# Patient Record
Sex: MALE | Race: WHITE | NOT HISPANIC OR LATINO | Employment: FULL TIME | URBAN - METROPOLITAN AREA
[De-identification: names, ages, dates, MRNs, and addresses within clinical notes are randomized per-mention and may not be internally consistent; named-entity substitution may affect disease eponyms.]

---

## 2018-07-09 ENCOUNTER — OFFICE VISIT (OUTPATIENT)
Dept: INTERNAL MEDICINE | Facility: CLINIC | Age: 43
End: 2018-07-09

## 2018-07-09 VITALS
TEMPERATURE: 98.3 F | HEIGHT: 76 IN | DIASTOLIC BLOOD PRESSURE: 76 MMHG | HEART RATE: 72 BPM | OXYGEN SATURATION: 96 % | WEIGHT: 296.9 LBS | BODY MASS INDEX: 36.15 KG/M2 | SYSTOLIC BLOOD PRESSURE: 110 MMHG

## 2018-07-09 DIAGNOSIS — E66.09 CLASS 2 OBESITY DUE TO EXCESS CALORIES WITHOUT SERIOUS COMORBIDITY WITH BODY MASS INDEX (BMI) OF 36.0 TO 36.9 IN ADULT: ICD-10-CM

## 2018-07-09 DIAGNOSIS — Z00.00 HEALTHCARE MAINTENANCE: Primary | ICD-10-CM

## 2018-07-09 PROBLEM — E66.9 CLASS 2 OBESITY WITHOUT SERIOUS COMORBIDITY WITH BODY MASS INDEX (BMI) OF 36.0 TO 36.9 IN ADULT: Status: ACTIVE | Noted: 2018-07-09

## 2018-07-09 PROBLEM — E66.812 CLASS 2 OBESITY WITHOUT SERIOUS COMORBIDITY WITH BODY MASS INDEX (BMI) OF 36.0 TO 36.9 IN ADULT: Status: ACTIVE | Noted: 2018-07-09

## 2018-07-09 PROCEDURE — 99386 PREV VISIT NEW AGE 40-64: CPT | Performed by: FAMILY MEDICINE

## 2018-07-09 NOTE — PROGRESS NOTES
Subjective   Robin Arora is a 42 y.o. male.     Chief Complaint   Patient presents with   • new patient visit   • enlarged liver     [facility]   • mild discomfort in right ear         History of Present Illness   Robin Arora 42 y.o. male who presents for an Annual Wellness Visit.  he has a history of   Patient Active Problem List   Diagnosis   • Healthcare maintenance   • Class 2 obesity without serious comorbidity with body mass index (BMI) of 36.0 to 36.9 in adult   .  he has been feeling well.  Labs results discussed in detail with the patient.  Plan to update vaccines if needed today.  I  reviewed health maintenance with him as part of my preventative care plan.    Health Habits:  Dental Exam. unknown  Eye Exam. up to date  Exercise: 0 times/week.  Current exercise activities include: none        The following portions of the patient's history were reviewed and updated as appropriate: allergies, current medications, past family history, past medical history, past social history, past surgical history and problem list.    Review of Systems   Constitutional: Negative for appetite change, fever and unexpected weight change.   HENT: Negative for ear pain, facial swelling and sore throat.    Eyes: Negative for pain and visual disturbance.   Respiratory: Negative for chest tightness, shortness of breath and wheezing.    Cardiovascular: Negative for chest pain and palpitations.   Gastrointestinal: Negative for abdominal pain and blood in stool.   Endocrine: Negative.    Genitourinary: Negative for difficulty urinating and hematuria.   Musculoskeletal: Negative for joint swelling.   Neurological: Negative for tremors, seizures and syncope.   Hematological: Negative for adenopathy.   Psychiatric/Behavioral: Negative.        Objective   Physical Exam   Constitutional: He is oriented to person, place, and time. He appears well-developed and well-nourished. No distress.   HENT:   Head: Normocephalic and atraumatic.    Mouth/Throat: Oropharynx is clear and moist.   Debris bilaterally ears   Eyes: Conjunctivae and EOM are normal. Pupils are equal, round, and reactive to light. Right eye exhibits no discharge. Left eye exhibits no discharge. No scleral icterus.   Neck: Normal range of motion. Neck supple. No tracheal deviation present. No thyromegaly present.   Cardiovascular: Normal rate, regular rhythm, normal heart sounds, intact distal pulses and normal pulses.  Exam reveals no gallop.    No murmur heard.  Pulmonary/Chest: Effort normal and breath sounds normal. No respiratory distress. He has no wheezes. He has no rales.   Musculoskeletal: Normal range of motion.   Neurological: He is alert and oriented to person, place, and time. He exhibits normal muscle tone. Coordination normal.   Skin: Skin is warm. No rash noted. No erythema. No pallor.   Psychiatric: He has a normal mood and affect. His behavior is normal. Judgment and thought content normal.   Nursing note and vitals reviewed.      Assessment/Plan   Robin was seen today for new patient visit, enlarged liver and mild discomfort in right ear.    Diagnoses and all orders for this visit:    Healthcare maintenance    Class 2 obesity due to excess calories without serious comorbidity with body mass index (BMI) of 36.0 to 36.9 in adult    [facility] UA A1c and lipid panel TSH  Recommend summers here prevention after swimming if symptoms persist follow-up appointment or had topical antibiotic recommend weight loss with diet change decrease calories decreased soft drinks increase physical activity for a target heart rate of 135 bpm for 30 minutes 3 times a week follow-up otherwise as needed or in one year

## 2018-07-11 ENCOUNTER — TELEPHONE (OUTPATIENT)
Dept: INTERNAL MEDICINE | Facility: CLINIC | Age: 43
End: 2018-07-11

## 2018-07-11 NOTE — TELEPHONE ENCOUNTER
"Patient called stating that after speaking with his insurance company he would like for his visit on Monday to be coded as a \"wellness\" since it would be covered.  Please advise.  "

## 2018-12-17 ENCOUNTER — OFFICE VISIT (OUTPATIENT)
Dept: INTERNAL MEDICINE | Facility: CLINIC | Age: 43
End: 2018-12-17

## 2018-12-17 VITALS
WEIGHT: 291.6 LBS | DIASTOLIC BLOOD PRESSURE: 70 MMHG | OXYGEN SATURATION: 99 % | BODY MASS INDEX: 35.51 KG/M2 | HEIGHT: 76 IN | SYSTOLIC BLOOD PRESSURE: 110 MMHG | HEART RATE: 91 BPM | TEMPERATURE: 98.5 F

## 2018-12-17 DIAGNOSIS — E66.09 CLASS 2 OBESITY DUE TO EXCESS CALORIES WITHOUT SERIOUS COMORBIDITY WITH BODY MASS INDEX (BMI) OF 36.0 TO 36.9 IN ADULT: Primary | ICD-10-CM

## 2018-12-17 DIAGNOSIS — K21.9 GASTROESOPHAGEAL REFLUX DISEASE WITHOUT ESOPHAGITIS: ICD-10-CM

## 2018-12-17 DIAGNOSIS — G47.33 OBSTRUCTIVE SLEEP APNEA SYNDROME: ICD-10-CM

## 2018-12-17 DIAGNOSIS — F51.01 PRIMARY INSOMNIA: ICD-10-CM

## 2018-12-17 PROCEDURE — 99214 OFFICE O/P EST MOD 30 MIN: CPT | Performed by: FAMILY MEDICINE

## 2018-12-17 RX ORDER — ZOLPIDEM TARTRATE 5 MG/1
5 TABLET ORAL NIGHTLY PRN
Qty: 30 TABLET | Refills: 1 | Status: SHIPPED | OUTPATIENT
Start: 2018-12-17 | End: 2019-01-07

## 2018-12-17 RX ORDER — DEXTROAMPHETAMINE SULFATE 5 MG/1
1 TABLET ORAL 2 TIMES DAILY
COMMUNITY
Start: 2018-11-15

## 2018-12-17 NOTE — PROGRESS NOTES
Robin Arora is a 43 y.o. male.      Assessment/Plan   Problem List Items Addressed This Visit        Respiratory    Obstructive sleep apnea syndrome       Digestive    Class 2 obesity without serious comorbidity with body mass index (BMI) of 36.0 to 36.9 in adult - Primary    Gastroesophageal reflux disease without esophagitis       Other    Primary insomnia         trial Ambien  Patient will follow-up in 3 weeks patient will call in success of using the Ambien  [facility]mission [facility] were reviewed and suppressed HDL otherwise normal lipid with low LDL and total cholesterol has been included medius section the chart    Chief Complaint   Patient presents with   • Insomnia     patient has a sleep apnea [facility]   • would like to discuss lab results     [facility]   • patient taking medication for acid  reflux but does not know     Social History     Tobacco Use   • Smoking status: Never Smoker   • Smokeless tobacco: Never Used   Substance Use Topics   • Alcohol use: Yes     Comment: 1-5 per month   • Drug use: Not on file       History of Present Illness   Subjective follow-up to discuss chronic conditions of obstructive sleep apnea obesity GERD insomnia he's been diagnosed with [facility] and cannot tolerate CPAP machine because he has trouble falling asleep he finally given some time but it's been many months he still can't tolerate apparatus as well as his unfortunate fluctuating sleep schedule due to shift work changes his usual sleep is from 6 in the morning or 7 in the morning till noon or 1 in the afternoon and goes to work at 6:00 at night not have much benefit with over-the-counter remedies.  Is trying to lose weight with increased physical activity with minimal success his reflux is well controlled with pantoprazole  The following portions of the patient's history were reviewed and updated as appropriate:PMHroutine: Social history , Past Medical History, Surgical history ,  "Allergies, Current Medications, Active Problem List and Health Maintenance    Review of Systems   Constitutional: Negative for activity change, appetite change and unexpected weight change.   HENT: Negative for nosebleeds and trouble swallowing.    Eyes: Negative for pain and visual disturbance.   Respiratory: Negative for chest tightness, shortness of breath and wheezing.    Cardiovascular: Negative for chest pain and palpitations.   Gastrointestinal: Negative for abdominal pain and blood in stool.   Endocrine: Negative.    Genitourinary: Negative for difficulty urinating and hematuria.   Musculoskeletal: Negative for joint swelling.   Skin: Negative for color change and rash.   Allergic/Immunologic: Negative.    Neurological: Negative for syncope and speech difficulty.   Hematological: Negative for adenopathy.   Psychiatric/Behavioral: Negative for agitation and confusion.   All other systems reviewed and are negative.      Objective   Vitals:    12/17/18 1549   BP: 110/70   BP Location: Right arm   Patient Position: Sitting   Cuff Size: Large Adult   Pulse: 91   Temp: 98.5 °F (36.9 °C)   TempSrc: Oral   SpO2: 99%   Weight: 132 kg (291 lb 9.6 oz)   Height: 191.8 cm (75.5\")     Body mass index is 35.97 kg/m².  Physical Exam   Constitutional: He is oriented to person, place, and time. He appears well-developed and well-nourished.   HENT:   Head: Normocephalic and atraumatic.   Eyes: Conjunctivae are normal. Pupils are equal, round, and reactive to light.   Neck: Normal range of motion.   Cardiovascular: Normal rate.   Pulmonary/Chest: Effort normal.   Musculoskeletal: Normal range of motion. He exhibits no edema.   Neurological: He is alert and oriented to person, place, and time.   Psychiatric: He has a normal mood and affect. His behavior is normal. Judgment and thought content normal.   Nursing note and vitals reviewed.    Reviewed Data:  No visits with results within 1 Month(s) from this visit.   Latest known " visit with results is:   No results found for any previous visit.

## 2019-01-07 ENCOUNTER — TELEPHONE (OUTPATIENT)
Dept: INTERNAL MEDICINE | Facility: CLINIC | Age: 44
End: 2019-01-07

## 2019-01-07 RX ORDER — TRAZODONE HYDROCHLORIDE 50 MG/1
50 TABLET ORAL NIGHTLY
Qty: 30 TABLET | Refills: 0 | Status: SHIPPED | OUTPATIENT
Start: 2019-01-07 | End: 2019-10-20 | Stop reason: SDUPTHER

## 2019-01-07 NOTE — TELEPHONE ENCOUNTER
"Patient called stating that the Ambien is not working for him.  He is waking up every 4 hours - he has taken 3-4 at night and has had no relief.  Patient stated that his \"nurse friends\" have told him that he needs Trazodone.  Please advise.  "

## 2019-06-17 ENCOUNTER — OFFICE VISIT (OUTPATIENT)
Dept: INTERNAL MEDICINE | Facility: CLINIC | Age: 44
End: 2019-06-17

## 2019-06-17 VITALS
OXYGEN SATURATION: 97 % | SYSTOLIC BLOOD PRESSURE: 140 MMHG | TEMPERATURE: 98.6 F | WEIGHT: 281.5 LBS | BODY MASS INDEX: 34.72 KG/M2 | DIASTOLIC BLOOD PRESSURE: 76 MMHG | HEART RATE: 74 BPM

## 2019-06-17 DIAGNOSIS — F51.01 PRIMARY INSOMNIA: ICD-10-CM

## 2019-06-17 DIAGNOSIS — F41.9 ANXIETY: ICD-10-CM

## 2019-06-17 DIAGNOSIS — G47.33 OBSTRUCTIVE SLEEP APNEA SYNDROME: ICD-10-CM

## 2019-06-17 DIAGNOSIS — N52.9 ERECTILE DYSFUNCTION, UNSPECIFIED ERECTILE DYSFUNCTION TYPE: ICD-10-CM

## 2019-06-17 DIAGNOSIS — Z30.09 VASECTOMY EVALUATION: Primary | ICD-10-CM

## 2019-06-17 PROCEDURE — 99214 OFFICE O/P EST MOD 30 MIN: CPT | Performed by: FAMILY MEDICINE

## 2019-06-17 RX ORDER — BUPROPION HYDROCHLORIDE 150 MG/1
300 TABLET ORAL DAILY
COMMUNITY
End: 2021-08-19

## 2019-06-17 RX ORDER — TADALAFIL 10 MG/1
10 TABLET ORAL DAILY PRN
Qty: 5 TABLET | Refills: 6 | Status: SHIPPED | OUTPATIENT
Start: 2019-06-17 | End: 2021-02-05 | Stop reason: SDUPTHER

## 2019-06-17 NOTE — PROGRESS NOTES
Robin Arora is a 43 y.o. male.      Assessment/Plan   Problem List Items Addressed This Visit        Respiratory    Obstructive sleep apnea syndrome    Overview     CPAP face mask since 2018            Other    Primary insomnia    Vasectomy evaluation - Primary    Relevant Orders    Ambulatory Referral to Urology    Anxiety      Other Visit Diagnoses     Erectile dysfunction, unspecified erectile dysfunction type               Continue present treatment of chronic problems of anxiety with [facility] DEX[facility] Cialis sleep apnea CPAP follow-up otherwise as needed or  Return in about 6 months (around 12/17/2019), or if symptoms worsen or fail to improve, for Recheck, Next scheduled follow up.      Chief Complaint   Patient presents with   • would like to have have a vasectomy   • would like to discuss Rx for Cialis   • Anxiety     Social History     Tobacco Use   • Smoking status: Never Smoker   • Smokeless tobacco: Never Used   Substance Use Topics   • Alcohol use: Yes     Comment: 1-5 per month   • Drug use: Not on file       History of Present Illness   Patient follows up for chronic problems of ED anxiety see sleep apnea wishes to have a vasectomy he does not have any children and we discussed his need for eating medicine for maintaining erections he admits that he has a lot of personal  Peculiar environmental and emotional parameters that have been added during intercourse with Cialis have helped some he has no unwanted side effects he is recently started [facility] and ask for the opinion regarding proper treatment for his mental health    The following portions of the patient's history were reviewed and updated as appropriate:PMHroutine: Social history , Allergies, Current Medications, Active Problem List and Health Maintenance    Review of Systems   Constitutional: Negative.    HENT: Negative.    Eyes: Negative.    Respiratory: Negative.    Gastrointestinal: Negative.     Genitourinary: Negative.    Neurological: Negative.    Psychiatric/Behavioral: Positive for sleep disturbance. The patient is nervous/anxious.        Objective   Vitals:    06/17/19 1515   BP: 140/76   BP Location: Left arm   Patient Position: Sitting   Cuff Size: Large Adult   Pulse: 74   Temp: 98.6 °F (37 °C)   TempSrc: Oral   SpO2: 97%   Weight: 128 kg (281 lb 8 oz)     Body mass index is 34.72 kg/m².  Physical Exam   Constitutional: He is oriented to person, place, and time. He appears well-developed and well-nourished. No distress.   HENT:   Head: Normocephalic and atraumatic.   Eyes: Conjunctivae and EOM are normal. Pupils are equal, round, and reactive to light. Right eye exhibits no discharge. Left eye exhibits no discharge. No scleral icterus.   Neck: Normal range of motion. Neck supple. No tracheal deviation present. No thyromegaly present.   Cardiovascular: Normal rate, regular rhythm, normal heart sounds, intact distal pulses and normal pulses. Exam reveals no gallop.   No murmur heard.  Pulmonary/Chest: Effort normal and breath sounds normal. No respiratory distress. He has no wheezes. He has no rales.   Musculoskeletal: Normal range of motion.   Neurological: He is alert and oriented to person, place, and time. He exhibits normal muscle tone. Coordination normal.   Skin: Skin is warm. No rash noted. No erythema. No pallor.   Psychiatric: He has a normal mood and affect. His behavior is normal. Judgment and thought content normal.   Nursing note and vitals reviewed.    Reviewed Data:  No visits with results within 1 Month(s) from this visit.   Latest known visit with results is:   No results found for any previous visit.

## 2019-10-21 RX ORDER — TRAZODONE HYDROCHLORIDE 50 MG/1
50 TABLET ORAL NIGHTLY
Qty: 30 TABLET | Refills: 0 | Status: SHIPPED | OUTPATIENT
Start: 2019-10-21 | End: 2020-04-30

## 2020-04-28 ENCOUNTER — TELEPHONE (OUTPATIENT)
Dept: INTERNAL MEDICINE | Facility: CLINIC | Age: 45
End: 2020-04-28

## 2020-04-28 NOTE — TELEPHONE ENCOUNTER
PATIENT CALLING, STATES HE IS ON SLEEP MEDS THAT HE TAKES AS NEEDED BECAUSE HE IS HAVING INSOMNIA AND ANXIETY. HAS TAKEN IT THE LAST TWO NIGHTS, WAKES UP AFTER FIVE HOURS OR JUST WONT SLEEP AT ALL. ALSO USING CPAP MACHINE, HE CANT USE IT, CANT SLEEP AT ALL. DOES HE NEED TO UP THE DOSE ON HIS SLEEP MEDS OR CALL SOMETHING ELSE IN TO TRY?    VERIFIED [facility] [address].    PATIENT CALL BACK -[phone].

## 2020-04-30 ENCOUNTER — OFFICE VISIT (OUTPATIENT)
Dept: INTERNAL MEDICINE | Facility: CLINIC | Age: 45
End: 2020-04-30

## 2020-04-30 VITALS — BODY MASS INDEX: 37 KG/M2 | WEIGHT: 300 LBS

## 2020-04-30 DIAGNOSIS — E66.09 CLASS 2 OBESITY DUE TO EXCESS CALORIES WITHOUT SERIOUS COMORBIDITY WITH BODY MASS INDEX (BMI) OF 36.0 TO 36.9 IN ADULT: ICD-10-CM

## 2020-04-30 DIAGNOSIS — F51.01 PRIMARY INSOMNIA: ICD-10-CM

## 2020-04-30 DIAGNOSIS — G47.33 OBSTRUCTIVE SLEEP APNEA SYNDROME: Primary | ICD-10-CM

## 2020-04-30 DIAGNOSIS — F41.9 ANXIETY: ICD-10-CM

## 2020-04-30 PROBLEM — Z30.09 VASECTOMY EVALUATION: Status: RESOLVED | Noted: 2019-06-17 | Resolved: 2020-04-30

## 2020-04-30 PROCEDURE — 99443 PR PHYS/QHP TELEPHONE EVALUATION 21-30 MIN: CPT | Performed by: FAMILY MEDICINE

## 2020-04-30 RX ORDER — QUETIAPINE FUMARATE 25 MG/1
25 TABLET, FILM COATED ORAL NIGHTLY
Qty: 30 TABLET | Refills: 3 | Status: SHIPPED | OUTPATIENT
Start: 2020-04-30 | End: 2021-08-19 | Stop reason: SDUPTHER

## 2020-04-30 NOTE — PROGRESS NOTES
Robin Arora is a 44 y.o. male.      Assessment/Plan   Problem List Items Addressed This Visit        Respiratory    Obstructive sleep apnea syndrome - Primary    Overview     CPAP face mask since 2018            Digestive    Class 2 obesity without serious comorbidity with body mass index (BMI) of 36.0 to 36.9 in adult       Other    Primary insomnia    Anxiety         25 minutes was spent with patient and greater than 50 % of time spent  discussing his pathology impression medication and treatment plan will stop trazodone start Seroquel frequently for going to bed follow-up as needed or    Return in about 1 month (around 5/30/2020), or if symptoms worsen or fail to improve, for Recheck, Next scheduled follow up.      Chief Complaint   Patient presents with   • Insomnia   • Anxiety   • Obesity     Social History     Tobacco Use   • Smoking status: Never Smoker   • Smokeless tobacco: Never Used   Substance Use Topics   • Alcohol use: Yes     Comment: 1-5 per month   • Drug use: Not on file   This visit has been rescheduled as a phone visit to comply with patient safety concerns in accordance with CDC recommendations. Total time of discussion was 30 minutes.    You have chosen to receive care through a telephone visit. Do you consent to use a telephone visit for your medical care today? Yes      History of Present Illness   Patient follow-up appointment to discuss insomnia difficulty sleeping and tolerating his CPAP is missed the CPAP.  [facility] is taking trazodone and Ambien without much benefit is likely more than 3 quality of sleep is increased anxiety he is trying to establish relationship with psychiatrist he has many stressors with work and family.  The following portions of the patient's history were reviewed and updated as appropriate:PMHroutine: Social history , Allergies, Current Medications, Active Problem List and Health Maintenance    Review of Systems   Constitutional: Negative.    HENT: Negative.     Respiratory: Negative.    Cardiovascular: Negative.    Psychiatric/Behavioral: Positive for agitation, behavioral problems and self-injury. The patient is nervous/anxious.        Objective   Vitals:    04/30/20 1556   Weight: 136 kg (300 lb)     Body mass index is 37 kg/m².  Physical Exam  Reviewed Data:  No visits with results within 1 Month(s) from this visit.   Latest known visit with results is:   No results found for any previous visit.

## 2021-02-05 NOTE — TELEPHONE ENCOUNTER
Caller: Robin Arora    Relationship: Self    Best call back number:[phone]    Medication needed:   Requested Prescriptions     Pending Prescriptions Disp Refills   • tadalafil (Cialis) 10 MG tablet 5 tablet 6     Sig: Take 1 tablet by mouth Daily As Needed for Erectile Dysfunction.       When do you need the refill by: 2-5-21    What details did the patient provide when requesting the medication: PATIENT IS OUT OF REFILLS AND MEDICATION    Does the patient have less than a 3 day supply:  [x] Yes  [] No    What is the patient's preferred pharmacy: [id number] - [place], [place] - [address] AT [facility] [address]  - [phone] [facility] [phone] FX

## 2021-02-08 RX ORDER — TADALAFIL 10 MG/1
10 TABLET ORAL DAILY PRN
Qty: 5 TABLET | Refills: 6 | Status: SHIPPED | OUTPATIENT
Start: 2021-02-08

## 2021-08-10 ENCOUNTER — TELEPHONE (OUTPATIENT)
Dept: INTERNAL MEDICINE | Facility: CLINIC | Age: 46
End: 2021-08-10

## 2021-08-10 RX ORDER — QUETIAPINE FUMARATE 25 MG/1
25 TABLET, FILM COATED ORAL NIGHTLY
Qty: 30 TABLET | Refills: 3 | OUTPATIENT
Start: 2021-08-10

## 2021-08-19 ENCOUNTER — TELEMEDICINE (OUTPATIENT)
Dept: INTERNAL MEDICINE | Facility: CLINIC | Age: 46
End: 2021-08-19

## 2021-08-19 VITALS
OXYGEN SATURATION: 98 % | BODY MASS INDEX: 36.79 KG/M2 | WEIGHT: 295.9 LBS | HEART RATE: 70 BPM | DIASTOLIC BLOOD PRESSURE: 70 MMHG | SYSTOLIC BLOOD PRESSURE: 120 MMHG | HEIGHT: 75 IN

## 2021-08-19 DIAGNOSIS — E66.09 CLASS 2 OBESITY DUE TO EXCESS CALORIES WITHOUT SERIOUS COMORBIDITY WITH BODY MASS INDEX (BMI) OF 36.0 TO 36.9 IN ADULT: Primary | ICD-10-CM

## 2021-08-19 DIAGNOSIS — G47.33 OBSTRUCTIVE SLEEP APNEA SYNDROME: ICD-10-CM

## 2021-08-19 DIAGNOSIS — Z00.00 HEALTHCARE MAINTENANCE: ICD-10-CM

## 2021-08-19 DIAGNOSIS — G89.29 CHRONIC BILATERAL LOW BACK PAIN WITH LEFT-SIDED SCIATICA: ICD-10-CM

## 2021-08-19 DIAGNOSIS — F51.01 PRIMARY INSOMNIA: ICD-10-CM

## 2021-08-19 DIAGNOSIS — F41.9 ANXIETY: ICD-10-CM

## 2021-08-19 DIAGNOSIS — K21.9 GASTROESOPHAGEAL REFLUX DISEASE WITHOUT ESOPHAGITIS: ICD-10-CM

## 2021-08-19 DIAGNOSIS — M54.42 CHRONIC BILATERAL LOW BACK PAIN WITH LEFT-SIDED SCIATICA: ICD-10-CM

## 2021-08-19 PROBLEM — M54.50 CHRONIC BILATERAL LOW BACK PAIN: Status: ACTIVE | Noted: 2021-08-19

## 2021-08-19 PROCEDURE — 99396 PREV VISIT EST AGE 40-64: CPT | Performed by: FAMILY MEDICINE

## 2021-08-19 RX ORDER — GABAPENTIN 300 MG/1
300 CAPSULE ORAL 3 TIMES DAILY PRN
COMMUNITY
Start: 2021-03-02

## 2021-08-19 RX ORDER — CYCLOBENZAPRINE HCL 10 MG
10 TABLET ORAL NIGHTLY PRN
COMMUNITY

## 2021-08-19 RX ORDER — PANTOPRAZOLE SODIUM 40 MG/1
TABLET, DELAYED RELEASE ORAL
COMMUNITY
Start: 2021-03-02

## 2021-08-19 RX ORDER — QUETIAPINE FUMARATE 25 MG/1
25 TABLET, FILM COATED ORAL NIGHTLY
Qty: 90 TABLET | Refills: 3
Start: 2021-08-19 | End: 2021-09-17

## 2021-08-19 RX ORDER — BUPROPION HYDROCHLORIDE 300 MG/1
300 TABLET ORAL DAILY
Qty: 90 TABLET | Refills: 3
Start: 2021-08-19

## 2021-08-19 RX ORDER — DEXTROAMPHETAMINE SULFATE 30 MG/1
1 TABLET ORAL DAILY
COMMUNITY

## 2021-08-19 RX ORDER — MELOXICAM 15 MG/1
TABLET ORAL
COMMUNITY
Start: 2021-03-09

## 2021-08-19 RX ORDER — HYDROXYZINE HYDROCHLORIDE 25 MG/1
25 TABLET, FILM COATED ORAL DAILY PRN
COMMUNITY

## 2021-08-19 RX ORDER — MELATONIN
1 DAILY
COMMUNITY
Start: 2021-03-03

## 2021-08-19 RX ORDER — BUPROPION HYDROCHLORIDE 300 MG/1
300 TABLET ORAL DAILY
COMMUNITY
End: 2021-08-19 | Stop reason: SDUPTHER

## 2021-08-19 RX ORDER — LORATADINE 10 MG/1
1 CAPSULE, LIQUID FILLED ORAL DAILY
COMMUNITY

## 2021-08-19 RX ORDER — ONDANSETRON HYDROCHLORIDE 8 MG/1
8 TABLET, FILM COATED ORAL EVERY 8 HOURS PRN
COMMUNITY

## 2021-08-19 NOTE — PROGRESS NOTES
Subjective   Robin Arora is a 45 y.o. male.     Chief Complaint   Patient presents with   • follow up to anxiety     Health maintenance    History of Present Illness   Robin Arora 45 y.o. male who presents for an Annual Wellness Visit.  he has a history of   Patient Active Problem List   Diagnosis   • Healthcare maintenance   • Class 2 obesity without serious comorbidity with body mass index (BMI) of 36.0 to 36.9 in adult   • Primary insomnia   • Gastroesophageal reflux disease without esophagitis   • Obstructive sleep apnea syndrome   • Anxiety   • Chronic bilateral low back pain   .  he has been feeling fairly well.  Labs results discussed in detail with the patient.  Plan to update vaccines if needed today.  I  reviewed health maintenance with him as part of my preventative care plan.  Night shift   Health Habits:  Dental Exam. up to date  Eye Exam. up to date  Exercise: 0 times/week.  Current exercise activities include: none      The following portions of the patient's history were reviewed and updated as appropriate: allergies, current medications, past family history, past medical history, past social history, past surgical history and problem list.    Review of Systems   Constitutional: Negative.    HENT: Negative.    Eyes: Negative.    Respiratory: Negative.    Cardiovascular: Negative.    Gastrointestinal: Negative.    Endocrine: Negative.    Genitourinary: Negative.    Musculoskeletal: Positive for arthralgias, back pain and gait problem.   Skin: Negative.    Hematological: Negative.    Psychiatric/Behavioral: Positive for sleep disturbance. The patient is nervous/anxious.        Objective   Physical Exam  Vitals and nursing note reviewed.   Constitutional:       Appearance: He is obese.   HENT:      Head: Normocephalic and atraumatic.   Cardiovascular:      Rate and Rhythm: Normal rate.      Pulses: Normal pulses.   Pulmonary:      Effort: Pulmonary effort is normal.   Musculoskeletal:      Right  lower leg: No edema.      Left lower leg: No edema.      Comments: Decreased strength right ankle has scarring   Neurological:      Mental Status: He is alert.   Psychiatric:         Mood and Affect: Mood normal.         Behavior: Behavior normal.         Thought Content: Thought content normal.         Judgment: Judgment normal.         Assessment/Plan   Diagnoses and all orders for this visit:    1. Class 2 obesity due to excess calories without serious comorbidity with body mass index (BMI) of 36.0 to 36.9 in adult (Primary)    2. Gastroesophageal reflux disease without esophagitis    3. Healthcare maintenance    4. Anxiety  -     buPROPion XL (WELLBUTRIN XL) 300 MG 24 hr tablet; Take 1 tablet by mouth Daily.  Dispense: 90 tablet; Refill: 3    5. Chronic bilateral low back pain with left-sided sciatica    6. Primary insomnia  -     QUEtiapine (SEROquel) 25 MG tablet; Take 1 tablet by mouth Every Night.  Dispense: 90 tablet; Refill: 3    7. Obstructive sleep apnea syndrome    [facility] except [facility]  At healthy lifestyle calorie appropriate diet regular physical activity  GERD medicine discontinued  Insomnia continue Seroquel 90 days 1 year supply

## 2021-09-16 DIAGNOSIS — F51.01 PRIMARY INSOMNIA: ICD-10-CM

## 2021-09-17 RX ORDER — QUETIAPINE FUMARATE 25 MG/1
25 TABLET, FILM COATED ORAL NIGHTLY
Qty: 30 TABLET | Refills: 3 | Status: SHIPPED | OUTPATIENT
Start: 2021-09-17

## 2025-05-19 ENCOUNTER — HOSPITAL ENCOUNTER (OUTPATIENT)
Dept: OTHER | Facility: HOSPITAL | Age: 50
Discharge: HOME | End: 2025-05-19

## 2025-06-17 ENCOUNTER — TRANSCRIBE ORDERS (OUTPATIENT)
Dept: ADMINISTRATIVE | Facility: HOSPITAL | Age: 50
End: 2025-06-17
Payer: OTHER GOVERNMENT

## 2025-06-17 DIAGNOSIS — E04.1 NONTOXIC SINGLE THYROID NODULE: Primary | ICD-10-CM

## 2025-06-30 ENCOUNTER — HOSPITAL ENCOUNTER (OUTPATIENT)
Dept: ULTRASOUND IMAGING | Facility: HOSPITAL | Age: 50
Discharge: HOME | End: 2025-06-30
Payer: OTHER GOVERNMENT

## 2025-06-30 DIAGNOSIS — E04.1 NONTOXIC SINGLE THYROID NODULE: ICD-10-CM

## 2025-06-30 PROCEDURE — 25010000002 LIDOCAINE 1 % SOLUTION

## 2025-06-30 PROCEDURE — 88173 CYTOPATH EVAL FNA REPORT: CPT | Performed by: INTERNAL MEDICINE

## 2025-06-30 PROCEDURE — 76942 ECHO GUIDE FOR BIOPSY: CPT

## 2025-06-30 RX ORDER — LIDOCAINE HYDROCHLORIDE 10 MG/ML
INJECTION, SOLUTION INFILTRATION; PERINEURAL
Status: COMPLETED
Start: 2025-06-30 | End: 2025-06-30

## 2025-06-30 RX ADMIN — LIDOCAINE HYDROCHLORIDE: 10 INJECTION, SOLUTION INFILTRATION; PERINEURAL at 13:14

## 2025-07-02 LAB
CYTO UR: NORMAL
LAB AP CASE REPORT: NORMAL
LAB AP CLINICAL INFORMATION: NORMAL
PATH REPORT.FINAL DX SPEC: NORMAL
PATH REPORT.GROSS SPEC: NORMAL